# Patient Record
Sex: MALE | Race: WHITE
[De-identification: names, ages, dates, MRNs, and addresses within clinical notes are randomized per-mention and may not be internally consistent; named-entity substitution may affect disease eponyms.]

---

## 2022-01-03 ENCOUNTER — HOSPITAL ENCOUNTER (EMERGENCY)
Dept: HOSPITAL 41 - JD.ED | Age: 62
Discharge: HOME | End: 2022-01-03
Payer: COMMERCIAL
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
## 2022-01-03 DIAGNOSIS — S52.591A: ICD-10-CM

## 2022-01-03 DIAGNOSIS — Z79.899: ICD-10-CM
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
## 2022-01-03 DIAGNOSIS — S52.611A: Primary | ICD-10-CM
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
## 2022-01-03 DIAGNOSIS — I10: ICD-10-CM

## 2022-01-03 PROCEDURE — 25605 CLTX DST RDL FX/EPHYS SEP W/: CPT

## 2022-01-03 PROCEDURE — 99152 MOD SED SAME PHYS/QHP 5/>YRS: CPT

## 2022-01-03 PROCEDURE — 73100 X-RAY EXAM OF WRIST: CPT

## 2022-01-03 PROCEDURE — 99283 EMERGENCY DEPT VISIT LOW MDM: CPT

## 2022-01-03 NOTE — EDM.PDOC
ED HPI GENERAL MEDICAL PROBLEM





- General


Chief Complaint: Upper Extremity Injury/Pain


Stated Complaint: ARM INJURY


Time Seen by Provider: 01/03/22 15:58


Source of Information: Reports: Patient


History Limitations: Reports: No Limitations





- History of Present Illness


INITIAL COMMENTS - FREE TEXT/NARRATIVE: 





The patient presents with right distal radius fracture.  The patient was out in 

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  

He did not hit his head or hurt his neck.  He went to the occupational clinic at

[facility] and they did an x-ray and he has a distal radius fracture.  They 

contacted Dr Woods who is not on call and he recommended the patient come here 

and we reduce the fracture.  He is both left and right handed.  He has no other 

injuries.  He has no medical problems.


Onset: Sudden


Duration: Hour(s):


Location: Reports: Upper Extremity, Right (wrist)


Quality: Reports: Sharp


Severity: Moderate


Improves with: Reports: Immobilization


Worsens with: Reports: Movement


Context: Reports: Trauma (fall)


Associated Symptoms: Reports: No Other Symptoms


  ** Right Wrist


Pain Score (Numeric/FACES): 7





- Related Data


                                    Allergies











Allergy/AdvReac Type Severity Reaction Status Date / Time


 


No Known Allergies Allergy   Verified 01/03/22 16:02











Home Meds: 


                                    Home Meds





Hydrocodone/Acetaminophen [Hydrocodone-Acetamin 5-325 mg] 1 - 2 each PO Q6H PRN 

#15 tablet 01/03/22 [Rx]


Metoprolol Succinate 25 mg PO DAILY 01/03/22 [History]











Past Medical History


Cardiovascular History: Reports: Hypertension


Musculoskeletal History: Reports: Arthritis





- Past Surgical History


GI Surgical History: Reports: Appendectomy, Hernia Repair/Other


Musculoskeletal Surgical History: Reports: Arthroscopic Knee





Social & Family History





- Tobacco Use


Tobacco Use Status *Q: Never Tobacco User


Second Hand Smoke Exposure: No





- Caffeine Use


Caffeine Use: Reports: Coffee





- Recreational Drug Use


Recreational Drug Use: No





Review of Systems





- Review of Systems


Review Of Systems: See Below


Constitutional: Reports: No Symptoms


Eyes: Reports: No Symptoms


Ears: Reports: No Symptoms


Nose: Reports: No Symptoms


Mouth/Throat: Reports: No Symptoms


Respiratory: Reports: No Symptoms


Cardiovascular: Reports: No Symptoms


GI/Abdominal: Reports: No Symptoms


Genitourinary: Reports: No Symptoms


Musculoskeletal: Reports: Other (right wrist pain and deformity)


Skin: Reports: No Symptoms


Neurological: Reports: No Symptoms





ED EXAM, GENERAL





- Physical Exam


Exam: See Below


Exam Limited By: No Limitations


General Appearance: Alert, No Apparent Distress


Ears: Normal External Exam


Nose: Normal Inspection


Head: Atraumatic, Normocephalic


Neck: Normal Inspection


Respiratory/Chest: No Respiratory Distress, Lungs Clear, Normal Breath Sounds


Cardiovascular: Regular Rate, Rhythm, No Edema, No Murmur, No Rub


GI/Abdominal: Soft, Non-Tender, No Organomegaly


Back Exam: Normal Inspection


Extremities: Other (Pain upon palpation and deformity to the right wrist.  Good 

sensation and pulses distally.)





ED TRAUMA EXTREMITY PROCEDURES





- Joint Reduction


  ** Right Wrist


Sedation: Conscious Sedation


Local Anesthesia - Lidocaine (Xylocaine): 1% Plain


Local Anesthetic Volume: 4cc


Pre-Procedure NV Status: Normal


Post-Procedure NV Status: Normal


Technique: Traction/Counter Traction


Number of Attempts: 2


Post-Reduction Imaging: Acceptably Reduced


Joint Reduction Complications: No





ED PROCEDURAL SEDATION





- Pre Procedure


Indications: fracture reduction


Preparations: procedure explained, consent signed, oxygen, continuous pulse 

oximeter, suction, continuous cardiac monitor, constant attendance





- Physical Exam


Airway: normal anatomy


Cardiovascular: normal heart sounds


Respiratory: normal breath sounds


Neurological: alert, responsive, NAD


Mallampati Classification: 1  (soft palate, anterior/posterior tonsillar 

pillars, uvula visible)





- Procedure Sedation


Procedural Sedation Start Date: 01/03/22


Procedural Sedation Start Time: 16:30


Sedation: versed (PO), other (dilaudid)


ASA Classification: 1 (Normal healthy patient)





- Intra Procedure


Condition during procedure: lightly sedated, vital signs stable, oxygenation 

stable, maintained airway well, handled secretions adequately


Complications: none


Reversal: none





- Post Procedure


Procedural Sedation End Date: 01/03/22


Procedural Sedation End Time: 17:00


Condition after procedure: alert, NAD, responds to verbal stimuli





- Discharge Condition


Patient returned to pre-procedure baseline: Yes


Alert prior to discharge: Yes


Ambulatory with assistance: Yes


Vital signs normal: Yes


Time spent with sedated patient: 30 min





Course





- Vital Signs


Last Recorded V/S: 


                                Last Vital Signs











Temp  97.1 F   01/03/22 16:00


 


Pulse  77   01/03/22 16:00


 


Resp  16   01/03/22 16:00


 


BP  171/103 H  01/03/22 16:00


 


Pulse Ox  95   01/03/22 16:00














- Orders/Labs/Meds


Orders: 


                               Active Orders 24 hr











 Category Date Time Status


 


 Peripheral IV Care [RC] .AS DIRECTED Care  01/03/22 16:04 Active


 


 Wrist 2V Rt [CR] Stat Exams  01/03/22 16:54 Taken


 


 Sodium Chloride 0.9% [Saline Flush] Med  01/03/22 16:04 Active





 10 ml FLUSH ASDIRECTED PRN   


 


 Peripheral IV Insertion Adult [OM.PC] Routine Oth  01/03/22 16:04 Ordered








                                Medication Orders





Sodium Chloride (Sodium Chloride 0.9% 10 Ml Syringe)  10 ml FLUSH ASDIRECTED PRN


   PRN Reason: Keep Vein Open


   Last Admin: 01/03/22 16:44  Dose: 10 ml


   Documented by: ELI








Meds: 


Medications











Generic Name Dose Route Start Last Admin





  Trade Name Freq  PRN Reason Stop Dose Admin


 


Sodium Chloride  10 ml  01/03/22 16:04  01/03/22 16:44





  Sodium Chloride 0.9% 10 Ml Syringe  FLUSH   10 ml





  ASDIRECTED PRN   Administration





  Keep Vein Open  














Discontinued Medications














Generic Name Dose Route Start Last Admin





  Trade Name Freq  PRN Reason Stop Dose Admin


 


Hydromorphone HCl  1 mg  01/03/22 16:04  01/03/22 16:44





  Hydromorphone 1 Mg/Ml Syringe  IVPUSH  01/03/22 16:05  1 mg





  ONETIME ONE   Administration


 


Lidocaine HCl  20 ml  01/03/22 16:05  01/03/22 16:44





  Lidocaine 1% 20 Ml Mdv  INJECT  01/03/22 16:06  20 ml





  ONETIME ONE   Administration


 


Midazolam HCl  2 mg  01/03/22 16:05  01/03/22 16:42





  Midazolam 1 Mg/Ml 2 Ml Sdv  IVPUSH  01/03/22 16:06  2 mg





  ONETIME ONE   Administration














- Re-Assessments/Exams


Free Text/Narrative Re-Assessment/Exam: 





01/03/22 17:10


The patient has a distal radius and ulna fracture.  I ordered an IV saline lock.

  I gave him dilaudid 1mg IV and versed 2mg IV and then I did a hematoma block. 

 I was able to reduce his fracture but it kept coming out so on the 2nd attempt 

I put him in a splint.


01/03/22 17:28


I did contact Dr Woods and he said it is good for now.  He is out of town in a 

couple day and wanted the patient referred to his partners in [place].





Departure





- Departure


Time of Disposition: 17:30


Disposition: Home, Self-Care 01


Condition: Good


Clinical Impression: 


Fall


Qualifiers:


 Encounter type: initial encounter Qualified Code(s): W19.XXXA - Unspecified 

fall, initial encounter





Distal radius fracture, right


Qualifiers:


 Encounter type: initial encounter Fracture type: closed Fracture morphology: 

other fracture Qualified Code(s): S52.591A - Other fractures of lower end of 

right radius, initial encounter for closed fracture





Fracture of right ulnar styloid


Qualifiers:


 Encounter type: initial encounter Fracture type: closed Fracture alignment: 

displaced Qualified Code(s): S52.611A - Displaced fracture of right ulna styloid

 process, initial encounter for closed fracture








- Discharge Information


*PRESCRIPTION DRUG MONITORING PROGRAM REVIEWED*: Not Applicable


*COPY OF PRESCRIPTION DRUG MONITORING REPORT IN PATIENT ARABELLA: Not Applicable


Prescriptions: 


Hydrocodone/Acetaminophen [Hydrocodone-Acetamin 5-325 mg] 1 - 2 each PO Q6H PRN 

#15 tablet


 PRN Reason: Pain


Instructions:  Moderate Conscious Sedation, Adult


Referrals: 


Mar Christensen NP [Primary Care Provider] - 


Dylan Min MD [Physician] - 1 Week


Forms:  ED Department Discharge, ED Return to Work/School Form


Additional Instructions: 


Ice your wrist for 15 minutes 3 times per day for 2 days.  Try to elevate your 

wrist above your hear as much as you can for 2 days to reduce the swelling.  

Take tylenol or motrin as needed for pain.  If that does not help, try the 

hydrocodone.  Follow up with Dr Min in [place] at [facility] or one of 

his partners.  Call right away in the morning.  They are good about getting 

patients in.  Please return if you are worse.





Sepsis Event Note (ED)





- Evaluation


Sepsis Screening Result: No Definite Risk





- Focused Exam


Vital Signs: 


                                   Vital Signs











  Temp Pulse Resp BP Pulse Ox


 


 01/03/22 16:00  97.1 F  77  16  171/103 H  95














- My Orders


Last 24 Hours: 


My Active Orders





01/03/22 16:04


Peripheral IV Care [RC] .AS DIRECTED 


Sodium Chloride 0.9% [Saline Flush]   10 ml FLUSH ASDIRECTED PRN 


Peripheral IV Insertion Adult [OM.PC] Routine 





01/03/22 16:54


Wrist 2V Rt [CR] Stat 














- Assessment/Plan


Last 24 Hours: 


My Active Orders





01/03/22 16:04


Peripheral IV Care [RC] .AS DIRECTED 


Sodium Chloride 0.9% [Saline Flush]   10 ml FLUSH ASDIRECTED PRN 


Peripheral IV Insertion Adult [OM.PC] Routine 





01/03/22 16:54


Wrist 2V Rt [CR] Stat

## 2022-01-04 NOTE — CR
LOCATION: [facility]

DATE/TIME: 1/3/2022 5:02 PM

INDICATION: Distal radius fracture.

COMPARISON: None currently available.

IMPRESSION: Transverse predominant mildly displaced distal right radial 
metaphyseal fracture. Slight dorsal

angulation of the distal radial articular surface. Mildly displaced ulnar 
styloid fracture. Right wrist soft tissue

swelling. External splint material in place. Mild STT and thumb [facility] joint 
osteoarthritis.

SIGNED BY: Mando Mcgill MD 1/3/2022 7:14 PM

[facility] System[facility]UN[facility]